# Patient Record
Sex: FEMALE | Race: WHITE | NOT HISPANIC OR LATINO | URBAN - METROPOLITAN AREA
[De-identification: names, ages, dates, MRNs, and addresses within clinical notes are randomized per-mention and may not be internally consistent; named-entity substitution may affect disease eponyms.]

---

## 2018-09-18 ENCOUNTER — OFFICE VISIT (OUTPATIENT)
Dept: URBAN - METROPOLITAN AREA CLINIC 29 | Facility: CLINIC | Age: 78
End: 2018-09-18
Payer: MEDICARE

## 2018-09-18 PROCEDURE — 99213 OFFICE O/P EST LOW 20 MIN: CPT | Performed by: OPHTHALMOLOGY

## 2018-09-18 ASSESSMENT — INTRAOCULAR PRESSURE
OD: 17
OS: 18

## 2018-09-18 NOTE — IMPRESSION/PLAN
Impression: Primary open-angle glaucoma, bilateral, severe stage. Code: I64.7466. ALT OS 2016 ALT OS 10/19/17 [address] OS 5/4/18 Plan: Discussed diagnosis, explained and understood by patient. Discussed IOP/ONH/Glaucoma management and risks. Continue Lumigan QHS OU and Timolol BID OU. Will continue to monitor.

## 2019-01-22 ENCOUNTER — OFFICE VISIT (OUTPATIENT)
Dept: URBAN - METROPOLITAN AREA CLINIC 29 | Facility: CLINIC | Age: 79
End: 2019-01-22
Payer: MEDICARE

## 2019-01-22 PROCEDURE — 92083 EXTENDED VISUAL FIELD XM: CPT | Performed by: OPHTHALMOLOGY

## 2019-01-22 PROCEDURE — 92133 CPTRZD OPH DX IMG PST SGM ON: CPT | Performed by: OPHTHALMOLOGY

## 2019-01-22 PROCEDURE — 99213 OFFICE O/P EST LOW 20 MIN: CPT | Performed by: OPHTHALMOLOGY

## 2019-01-22 RX ORDER — NETARSUDIL 0.2 MG/ML
0.02 % SOLUTION/ DROPS OPHTHALMIC; TOPICAL
Qty: 1 | Refills: 11 | Status: INACTIVE
Start: 2019-01-22 | End: 2019-05-03

## 2019-01-22 ASSESSMENT — INTRAOCULAR PRESSURE
OS: 19
OD: 19

## 2019-01-22 NOTE — IMPRESSION/PLAN
Impression: Primary open-angle glaucoma, bilateral, severe stage. Code: O36.6078. ALT OS 2016 ALT OS 10/19/17 [address] OS 5/4/18 IOP borderline Plan: Discussed diagnosis, explained and understood by patient. Discussed IOP/ONH/Glaucoma management and risks. OCT and Visual field ordered and reviewed today. Continue timolol bid ou and lumigan qhs ou. Start rhopressa qhs os, sample given. Discussed side effects of drops with pt. Will continue to monitor condition and symptoms.

## 2019-02-22 ENCOUNTER — OFFICE VISIT (OUTPATIENT)
Dept: URBAN - METROPOLITAN AREA CLINIC 29 | Facility: CLINIC | Age: 79
End: 2019-02-22
Payer: MEDICARE

## 2019-02-22 PROCEDURE — 99213 OFFICE O/P EST LOW 20 MIN: CPT | Performed by: OPHTHALMOLOGY

## 2019-02-22 RX ORDER — DUREZOL 0.5 MG/ML
0.05 % EMULSION OPHTHALMIC
Qty: 1 | Refills: 0 | Status: INACTIVE
Start: 2019-02-22 | End: 2019-04-15

## 2019-02-22 RX ORDER — BIMATOPROST 0.1 MG/ML
0.01 % SOLUTION/ DROPS OPHTHALMIC
Qty: 2.5 | Refills: 11 | Status: INACTIVE
Start: 2019-02-22 | End: 2019-12-02

## 2019-02-22 ASSESSMENT — INTRAOCULAR PRESSURE
OS: 16
OD: 18

## 2019-02-22 NOTE — IMPRESSION/PLAN
Impression: Primary open-angle glaucoma, bilateral, severe stage. Code: Q62.4503. ALT OS 2016 ---ALT OS 10/19/17 [facility]  micropulse OS 5/4/18-- irregular and fast heart beats - Plan: Discussed diagnosis, explained and understood by patient. Discussed IOP/ONH/Glaucoma management and risks. OCT and Visual field ordered and reviewed today. Continue timolol bid ou and lumigan qhs ou.  
patient schedule for cardiac implant march 6th (implantable loop recorder)
discontinue rhopressa OS. discussed Trabeculectomy OD vs 
repeat [facility] laser OS if unable to tolerate glaucoma meds or cost too high. Discussed RBA's and laser procedure. RL=2 Patient elects [facility] laser OS.  sample of durezol bid OS x 7 days after laser -

## 2019-04-23 ENCOUNTER — SURGERY (OUTPATIENT)
Dept: URBAN - METROPOLITAN AREA SURGERY 11 | Facility: SURGERY | Age: 79
End: 2019-04-23
Payer: MEDICARE

## 2019-04-23 PROCEDURE — 66710 CILIARY TRANSSLERAL THERAPY: CPT | Performed by: OPHTHALMOLOGY

## 2019-05-03 ENCOUNTER — POST-OPERATIVE VISIT (OUTPATIENT)
Dept: URBAN - METROPOLITAN AREA CLINIC 29 | Facility: CLINIC | Age: 79
End: 2019-05-03

## 2019-05-03 DIAGNOSIS — Z09 ENCNTR FOR F/U EXAM AFT TRTMT FOR COND OTH THAN MALIG NEOPLM: Primary | ICD-10-CM

## 2019-05-03 PROCEDURE — 99024 POSTOP FOLLOW-UP VISIT: CPT | Performed by: OPHTHALMOLOGY

## 2019-05-03 ASSESSMENT — INTRAOCULAR PRESSURE
OD: 19
OS: 18

## 2019-06-07 ENCOUNTER — POST-OPERATIVE VISIT (OUTPATIENT)
Dept: URBAN - METROPOLITAN AREA CLINIC 29 | Facility: CLINIC | Age: 79
End: 2019-06-07
Payer: MEDICARE

## 2019-06-07 PROCEDURE — 99024 POSTOP FOLLOW-UP VISIT: CPT | Performed by: OPHTHALMOLOGY

## 2019-06-07 RX ORDER — PREDNISOLONE ACETATE 10 MG/ML
1 % SUSPENSION/ DROPS OPHTHALMIC
Qty: 1 | Refills: 0 | Status: INACTIVE
Start: 2019-06-07 | End: 2019-06-07

## 2019-06-07 RX ORDER — DUREZOL 0.5 MG/ML
0.05 % EMULSION OPHTHALMIC
Qty: 1 | Refills: 1 | Status: INACTIVE
Start: 2019-06-07 | End: 2019-07-11

## 2019-06-07 RX ORDER — TIMOLOL MALEATE 5 MG/ML
0.5 % SOLUTION/ DROPS OPHTHALMIC
Qty: 5 | Refills: 5 | Status: INACTIVE
Start: 2019-06-07 | End: 2020-02-13

## 2019-06-07 ASSESSMENT — INTRAOCULAR PRESSURE
OD: 19
OS: 17

## 2019-07-03 ENCOUNTER — SURGERY (OUTPATIENT)
Dept: URBAN - METROPOLITAN AREA SURGERY 11 | Facility: SURGERY | Age: 79
End: 2019-07-03
Payer: MEDICARE

## 2019-07-03 PROCEDURE — 65855 TRABECULOPLASTY LASER SURG: CPT | Performed by: OPHTHALMOLOGY

## 2019-07-11 ENCOUNTER — POST-OPERATIVE VISIT (OUTPATIENT)
Dept: URBAN - METROPOLITAN AREA CLINIC 29 | Facility: CLINIC | Age: 79
End: 2019-07-11

## 2019-07-11 PROCEDURE — 99024 POSTOP FOLLOW-UP VISIT: CPT | Performed by: OPTOMETRIST

## 2019-07-11 ASSESSMENT — INTRAOCULAR PRESSURE
OS: 17
OD: 17

## 2019-08-09 ENCOUNTER — OFFICE VISIT (OUTPATIENT)
Dept: URBAN - METROPOLITAN AREA CLINIC 29 | Facility: CLINIC | Age: 79
End: 2019-08-09
Payer: MEDICARE

## 2019-08-09 PROCEDURE — 99213 OFFICE O/P EST LOW 20 MIN: CPT | Performed by: OPHTHALMOLOGY

## 2019-08-09 ASSESSMENT — INTRAOCULAR PRESSURE
OS: 19
OD: 18

## 2019-08-09 NOTE — IMPRESSION/PLAN
Impression: Primary open-angle glaucoma, bilateral, severe stage. Code: I70.8470. ALT OS 2016 ALT OS 10/19/17 [address] OS 5/4/18
irregular and fast heart beats Plan: Discussed diagnosis, explained and understood by patient. Discussed IOP/ONH/Glaucoma management and risks. Continue timolol bid ou and lumigan qhs ou. Will continue to monitor condition and symptoms.

## 2019-12-12 ENCOUNTER — OFFICE VISIT (OUTPATIENT)
Dept: URBAN - METROPOLITAN AREA CLINIC 23 | Facility: CLINIC | Age: 79
End: 2019-12-12
Payer: MEDICARE

## 2019-12-12 PROCEDURE — 99213 OFFICE O/P EST LOW 20 MIN: CPT | Performed by: OPHTHALMOLOGY

## 2019-12-12 PROCEDURE — 92083 EXTENDED VISUAL FIELD XM: CPT | Performed by: OPHTHALMOLOGY

## 2019-12-12 PROCEDURE — 92133 CPTRZD OPH DX IMG PST SGM ON: CPT | Performed by: OPHTHALMOLOGY

## 2019-12-12 ASSESSMENT — INTRAOCULAR PRESSURE
OS: 18
OD: 18

## 2019-12-12 NOTE — IMPRESSION/PLAN
Impression: Primary open-angle glaucoma, bilateral, severe stage. Code: H25.4284. ALT OS 2016 ALT OS 10/19/17 [address] OS 5/4/18
irregular and fast heart beats Plan: Discussed diagnosis, explained and understood by patient. Discussed IOP/ONH/Glaucoma management and risks. OCT and VF ordered and reviewed. Continue timolol bid ou and lumigan qhs ou. No Additional laser is recommended at this time. Will continue to monitor condition and symptoms.

## 2020-04-21 ENCOUNTER — OFFICE VISIT (OUTPATIENT)
Dept: URBAN - METROPOLITAN AREA CLINIC 23 | Facility: CLINIC | Age: 80
End: 2020-04-21
Payer: MEDICARE

## 2020-04-21 PROCEDURE — 99213 OFFICE O/P EST LOW 20 MIN: CPT | Performed by: OPHTHALMOLOGY

## 2020-04-21 ASSESSMENT — INTRAOCULAR PRESSURE
OS: 15
OD: 14

## 2020-04-21 NOTE — IMPRESSION/PLAN
Impression: Primary open-angle glaucoma, bilateral, severe stage. Code: O66.1631. ALT OS 2016 ALT OS 10/19/17 [address] OS 5/4/18
irregular and fast heart beats Plan: Discussed diagnosis, explained and understood by patient. Discussed IOP/ONH/Glaucoma management and risks. Continue timolol bid ou and lumigan qhs ou, as instructed, patient understand compliance with Drops. No Additional laser is recommended at this time. Will continue to monitor condition and symptoms.

## 2020-08-12 ENCOUNTER — OFFICE VISIT (OUTPATIENT)
Dept: URBAN - METROPOLITAN AREA CLINIC 23 | Facility: CLINIC | Age: 80
End: 2020-08-12
Payer: MEDICARE

## 2020-08-12 PROCEDURE — 92133 CPTRZD OPH DX IMG PST SGM ON: CPT | Performed by: OPHTHALMOLOGY

## 2020-08-12 PROCEDURE — 99214 OFFICE O/P EST MOD 30 MIN: CPT | Performed by: OPHTHALMOLOGY

## 2020-08-12 PROCEDURE — 92083 EXTENDED VISUAL FIELD XM: CPT | Performed by: OPHTHALMOLOGY

## 2020-08-12 ASSESSMENT — INTRAOCULAR PRESSURE
OD: 16
OS: 16

## 2020-08-12 NOTE — IMPRESSION/PLAN
Impression: Primary open-angle glaucoma, bilateral, severe stage. Code: K76.5512. ALT OS 2016 ALT OS 10/19/17 [address] OS 5/4/18
irregular and fast heart beats Plan: Discussed diagnosis, explained and understood by patient. Discussed IOP/ONH/Glaucoma management and risks. VF and OCT ordered and reviewed today shows no progression OU. Continue timolol bid ou and lumigan qhs ou. Will continue to monitor condition and symptoms.

## 2020-09-18 ENCOUNTER — OFFICE VISIT (OUTPATIENT)
Dept: URBAN - METROPOLITAN AREA CLINIC 23 | Facility: CLINIC | Age: 80
End: 2020-09-18
Payer: MEDICARE

## 2020-09-18 PROCEDURE — 92134 CPTRZ OPH DX IMG PST SGM RTA: CPT | Performed by: OPHTHALMOLOGY

## 2020-09-18 PROCEDURE — 92014 COMPRE OPH EXAM EST PT 1/>: CPT | Performed by: OPHTHALMOLOGY

## 2020-09-18 ASSESSMENT — INTRAOCULAR PRESSURE
OD: 17
OS: 17

## 2020-09-18 NOTE — IMPRESSION/PLAN
Impression: Puckering of macula, right eye: H35.371. Right. Condition: new prob, no addtl w/u needed. Hx of Repair of RD OS 03/2013 w/Dr Kessler Plan: Discussed diagnosis in detail with patient. Exam shows ERM confirmed by OCT and Optos OD. No treatment is required at this time. Will continue to observe condition and or symptoms. Will reassess condition in 6 mos, sooner if there is a change or decrease in vision. Continue care with Dr Bill Machado for the management of Glaucoma and use Glaucoma meds as recommended by him.

## 2021-01-07 ENCOUNTER — OFFICE VISIT (OUTPATIENT)
Dept: URBAN - METROPOLITAN AREA CLINIC 23 | Facility: CLINIC | Age: 81
End: 2021-01-07
Payer: MEDICARE

## 2021-01-07 PROCEDURE — 99213 OFFICE O/P EST LOW 20 MIN: CPT | Performed by: OPHTHALMOLOGY

## 2021-01-07 RX ORDER — DORZOLAMIDE HYDROCHLORIDE AND TIMOLOL MALEATE 20; 5 MG/ML; MG/ML
SOLUTION/ DROPS OPHTHALMIC
Qty: 5 | Refills: 11 | Status: INACTIVE
Start: 2021-01-07 | End: 2022-02-24

## 2021-01-07 ASSESSMENT — INTRAOCULAR PRESSURE
OS: 18
OD: 18

## 2021-01-07 NOTE — IMPRESSION/PLAN
Impression: Primary open-angle glaucoma, bilateral, severe stage. Code: N89.6201. ALT OS 2016 ALT OS 10/19/17 [facility] OS 5/4/18
irregular and fast heart beats Plan: Discussed diagnosis, explained and understood by patient. Discussed IOP/ONH/Glaucoma management and risks. D/C timolol bid ou. Continue lumigan qhs ou. Discussed adding drops vs laser to lower IOP. Patient elects drops. Start dorzolamide-timolol bid ou, sample given. Discussed side effects of drops. Will recheck IOP in 1 month.

## 2021-02-24 ENCOUNTER — OFFICE VISIT (OUTPATIENT)
Dept: URBAN - METROPOLITAN AREA CLINIC 23 | Facility: CLINIC | Age: 81
End: 2021-02-24
Payer: MEDICARE

## 2021-02-24 PROCEDURE — 99213 OFFICE O/P EST LOW 20 MIN: CPT | Performed by: OPHTHALMOLOGY

## 2021-02-24 ASSESSMENT — INTRAOCULAR PRESSURE
OD: 15
OS: 16

## 2021-02-24 NOTE — IMPRESSION/PLAN
Impression: Primary open-angle glaucoma, bilateral, severe stage. Code: O46.5144. ALT OS 2016 ALT OS 10/19/17 [facility] OS 5/4/18
irregular and fast heart beats Plan: Discussed diagnosis, explained and understood by patient. Discussed IOP/ONH/Glaucoma management and risks. Exam today shows stable IOP OU, additional medication was effective in lowering her IOP recommend to Continue dorzolamide-timolol bid ou, and Continue lumigan qhs ou, as instructed. Discussed side effects of drops, patient accepts and understands treatment plan.

## 2021-03-25 ENCOUNTER — OFFICE VISIT (OUTPATIENT)
Dept: URBAN - METROPOLITAN AREA CLINIC 23 | Facility: CLINIC | Age: 81
End: 2021-03-25
Payer: MEDICARE

## 2021-03-25 DIAGNOSIS — H35.371 PUCKERING OF MACULA, RIGHT EYE: Primary | ICD-10-CM

## 2021-03-25 PROCEDURE — 92134 CPTRZ OPH DX IMG PST SGM RTA: CPT | Performed by: OPHTHALMOLOGY

## 2021-03-25 PROCEDURE — 99213 OFFICE O/P EST LOW 20 MIN: CPT | Performed by: OPHTHALMOLOGY

## 2021-03-25 ASSESSMENT — INTRAOCULAR PRESSURE
OD: 15
OS: 15

## 2021-03-25 NOTE — IMPRESSION/PLAN
Impression: Puckering of macula, right eye: H35.371. Right. Condition: stable. Hx of Repair of RD OS 03/2013 w/Dr Kessler Plan: Due to Coronavirus COVID-19 pandemic and National Emergency, deferred Slit Lamp examination. Findings are based on OCT and Optos. OCT and Optos shows stable ERM OD. No treatment is require at this time. Recommend a retina follow - up in 6 mos. Patient can proceed with a refraction.

## 2021-05-10 ENCOUNTER — OFFICE VISIT (OUTPATIENT)
Dept: URBAN - METROPOLITAN AREA CLINIC 23 | Facility: CLINIC | Age: 81
End: 2021-05-10

## 2021-05-10 DIAGNOSIS — H52.223 REGULAR ASTIGMATISM, BILATERAL: Primary | ICD-10-CM

## 2021-05-10 ASSESSMENT — INTRAOCULAR PRESSURE
OD: 15
OS: 15

## 2021-07-14 ENCOUNTER — OFFICE VISIT (OUTPATIENT)
Dept: URBAN - METROPOLITAN AREA CLINIC 23 | Facility: CLINIC | Age: 81
End: 2021-07-14
Payer: MEDICARE

## 2021-07-14 DIAGNOSIS — H40.1133 PRIMARY OPEN-ANGLE GLAUCOMA, BILATERAL, SEVERE STAGE: Primary | ICD-10-CM

## 2021-07-14 PROCEDURE — 92083 EXTENDED VISUAL FIELD XM: CPT | Performed by: OPHTHALMOLOGY

## 2021-07-14 PROCEDURE — 99214 OFFICE O/P EST MOD 30 MIN: CPT | Performed by: OPHTHALMOLOGY

## 2021-07-14 PROCEDURE — 92133 CPTRZD OPH DX IMG PST SGM ON: CPT | Performed by: OPHTHALMOLOGY

## 2021-07-14 PROCEDURE — 92020 GONIOSCOPY: CPT | Performed by: OPHTHALMOLOGY

## 2021-07-14 ASSESSMENT — INTRAOCULAR PRESSURE
OD: 18
OS: 19

## 2021-07-14 NOTE — IMPRESSION/PLAN
Impression: Primary open-angle glaucoma, bilateral, severe stage. Code: O58.7221. ALT OS 2016 ALT OS 10/19/17 [address] OS 5/4/18
irregular and fast heart beats Plan: Discussed diagnosis, explained and understood by patient. Discussed IOP/ONH/Glaucoma management and risks. Exam and IOP today shows increased IOP, Continue with Dorzolamide-timolol bid ou, and Continue lumigan qhs ou, as instructed. Discussed adding drops vs laser. Patient elects SLT. Recommend Trabeculoplasty (SLT) OS to possibly lower IOP. Discussed RBA's of laser trabeculoplasty .  RL=2

## 2021-09-10 ENCOUNTER — SURGERY (OUTPATIENT)
Dept: URBAN - METROPOLITAN AREA SURGERY 11 | Facility: SURGERY | Age: 81
End: 2021-09-10
Payer: MEDICARE

## 2021-09-10 PROCEDURE — 65855 TRABECULOPLASTY LASER SURG: CPT | Performed by: OPHTHALMOLOGY

## 2021-10-01 ENCOUNTER — OFFICE VISIT (OUTPATIENT)
Dept: URBAN - METROPOLITAN AREA CLINIC 23 | Facility: CLINIC | Age: 81
End: 2021-10-01
Payer: MEDICARE

## 2021-10-01 PROCEDURE — 99213 OFFICE O/P EST LOW 20 MIN: CPT | Performed by: OPHTHALMOLOGY

## 2021-10-01 PROCEDURE — 92134 CPTRZ OPH DX IMG PST SGM RTA: CPT | Performed by: OPHTHALMOLOGY

## 2021-10-01 ASSESSMENT — INTRAOCULAR PRESSURE
OS: 19
OD: 18

## 2021-10-01 NOTE — IMPRESSION/PLAN
Impression: Puckering of macula, right eye: H35.371. Right. Condition: stable. Hx of Repair of RD OS 03/2013 w/Dr Kessler Plan: Due to Coronavirus COVID-19 pandemic and National Emergency, deferred Slit Lamp examination. Findings are based on OCT and Optos. OCT and Optos shows stable ERM, no significant change from 3/25/21 OD. No treatment is required at this time. Recommend a retina follow - up in 9 months, sooner if there is a sudden change in vision. Advised patient that at this time the pucker does not seem to be affecting the central vision therefore surgical treatment is not needed. Patient has questions about IOP-- defer patient to Dr Pretty Whitlock to discuss why IOP has not decreased since her SLT procedure.

## 2021-10-11 ENCOUNTER — OFFICE VISIT (OUTPATIENT)
Dept: URBAN - METROPOLITAN AREA CLINIC 23 | Facility: CLINIC | Age: 81
End: 2021-10-11
Payer: MEDICARE

## 2021-10-11 PROCEDURE — 99213 OFFICE O/P EST LOW 20 MIN: CPT | Performed by: OPHTHALMOLOGY

## 2021-10-11 RX ORDER — BIMATOPROST 0.1 MG/ML
0.01 % SOLUTION/ DROPS OPHTHALMIC
Qty: 7.5 | Refills: 4 | Status: ACTIVE
Start: 2021-10-11

## 2021-10-11 ASSESSMENT — INTRAOCULAR PRESSURE
OD: 18
OS: 16

## 2021-10-11 NOTE — IMPRESSION/PLAN
Impression: Primary open-angle glaucoma, bilateral, severe stage. Code: U49.6875. Hx of irregular and fast heart beats ALT OS 2016 ALT OS 10/19/17 [address] OS 5/4/18 SLT OS 9/10/21 Plan: Discussed diagnosis, explained and understood by patient. Discussed IOP/ONH/Glaucoma management and risks. Continue with Dorzolamide-timolol bid ou, and lumigan qhs ou. Will continue to monitor pressures.

## 2022-02-23 ENCOUNTER — OFFICE VISIT (OUTPATIENT)
Dept: URBAN - METROPOLITAN AREA CLINIC 23 | Facility: CLINIC | Age: 82
End: 2022-02-23
Payer: MEDICARE

## 2022-02-23 PROCEDURE — 99213 OFFICE O/P EST LOW 20 MIN: CPT | Performed by: OPHTHALMOLOGY

## 2022-02-23 ASSESSMENT — INTRAOCULAR PRESSURE
OS: 16
OD: 16

## 2022-02-23 NOTE — IMPRESSION/PLAN
Impression: Primary open-angle glaucoma, bilateral, severe stage. Code: U34.0908. Hx of irregular and fast heart beats ALT OS 2016 ALT OS 10/19/17 [address] OS 5/4/18 SLT OS 9/10/21 Plan: Discussed diagnosis, explained and understood by patient. Discussed IOP/ONH/Glaucoma management and risks. Continue with Dorzolamide-timolol bid ou, and lumigan qhs ou. Will continue to monitor pressures.

## 2022-07-08 ENCOUNTER — OFFICE VISIT (OUTPATIENT)
Dept: URBAN - METROPOLITAN AREA CLINIC 23 | Facility: CLINIC | Age: 82
End: 2022-07-08
Payer: MEDICARE

## 2022-07-08 DIAGNOSIS — H35.371 PUCKERING OF MACULA, RIGHT EYE: Primary | ICD-10-CM

## 2022-07-08 PROCEDURE — 92134 CPTRZ OPH DX IMG PST SGM RTA: CPT | Performed by: OPHTHALMOLOGY

## 2022-07-08 PROCEDURE — 99213 OFFICE O/P EST LOW 20 MIN: CPT | Performed by: OPHTHALMOLOGY

## 2022-07-08 ASSESSMENT — INTRAOCULAR PRESSURE
OD: 11
OS: 10

## 2022-07-08 NOTE — IMPRESSION/PLAN
Impression: Puckering of macula, right eye: H35.371. Right. Condition: stable. Vision: vision not affected. Hx of Repair of RD OS 03/2013 w/Dr Kessler Plan: Discussed diagnosis in detail with patient. Discussed treatment options with patient. No treatment needed at this time based on exam and diagnostic tests. Discussed surgery can be done to remove scar tissue. Patient defers surgery at this time. OCT and Optos shows stable ERM. Recommend a retina follow - up in 6 months.

## 2022-08-18 ENCOUNTER — OFFICE VISIT (OUTPATIENT)
Dept: URBAN - METROPOLITAN AREA CLINIC 23 | Facility: CLINIC | Age: 82
End: 2022-08-18
Payer: MEDICARE

## 2022-08-18 DIAGNOSIS — H40.1133 PRIMARY OPEN-ANGLE GLAUCOMA, BILATERAL, SEVERE STAGE: Primary | ICD-10-CM

## 2022-08-18 PROCEDURE — 99214 OFFICE O/P EST MOD 30 MIN: CPT | Performed by: OPHTHALMOLOGY

## 2022-08-18 PROCEDURE — 92133 CPTRZD OPH DX IMG PST SGM ON: CPT | Performed by: OPHTHALMOLOGY

## 2022-08-18 PROCEDURE — 92083 EXTENDED VISUAL FIELD XM: CPT | Performed by: OPHTHALMOLOGY

## 2022-08-18 RX ORDER — LOSARTAN POTASSIUM 50 MG/1
50 MG TABLET, FILM COATED ORAL
Qty: 0 | Refills: 0 | Status: ACTIVE
Start: 2022-08-18

## 2022-08-18 ASSESSMENT — INTRAOCULAR PRESSURE
OD: 18
OS: 16

## 2022-11-10 ENCOUNTER — OFFICE VISIT (OUTPATIENT)
Dept: URBAN - METROPOLITAN AREA CLINIC 23 | Facility: CLINIC | Age: 82
End: 2022-11-10
Payer: MEDICARE

## 2022-11-10 DIAGNOSIS — H40.1133 PRIMARY OPEN-ANGLE GLAUCOMA, BILATERAL, SEVERE STAGE: Primary | ICD-10-CM

## 2022-11-10 PROCEDURE — 99213 OFFICE O/P EST LOW 20 MIN: CPT | Performed by: OPHTHALMOLOGY

## 2022-11-10 ASSESSMENT — INTRAOCULAR PRESSURE
OD: 15
OS: 16

## 2022-11-10 NOTE — IMPRESSION/PLAN
Impression: Primary open-angle glaucoma, bilateral, severe stage. Code: S26.9555. Hx of irregular and fast heart beats ALT OS 2016 ALT OS 10/19/17 [address] OS 5/4/18 SLT OS 9/10/21 SLT OD 10/7/22 Plan: Discussed diagnosis, explained and understood by patient. Discussed IOP/ONH/Glaucoma management and risks. SLT OD effectively lowered IOP. Continue Dorzolamide-timolol bid ou, and lumigan qhs ou.

## 2023-01-10 ENCOUNTER — OFFICE VISIT (OUTPATIENT)
Dept: URBAN - METROPOLITAN AREA CLINIC 23 | Facility: CLINIC | Age: 83
End: 2023-01-10
Payer: MEDICARE

## 2023-01-10 DIAGNOSIS — H35.371 PUCKERING OF MACULA, RIGHT EYE: Primary | ICD-10-CM

## 2023-01-10 PROCEDURE — 99214 OFFICE O/P EST MOD 30 MIN: CPT | Performed by: OPHTHALMOLOGY

## 2023-01-10 PROCEDURE — 92134 CPTRZ OPH DX IMG PST SGM RTA: CPT | Performed by: OPHTHALMOLOGY

## 2023-01-10 RX ORDER — LOTEPREDNOL ETABONATE 5 MG/G
0.5 % GEL OPHTHALMIC
Qty: 5 | Refills: 3 | Status: ACTIVE
Start: 2023-01-10

## 2023-01-10 RX ORDER — OFLOXACIN 3 MG/ML
0.3 % SOLUTION/ DROPS OPHTHALMIC
Qty: 5 | Refills: 1 | Status: ACTIVE
Start: 2023-01-10

## 2023-01-10 RX ORDER — DUREZOL 0.5 MG/ML
0.05 % EMULSION OPHTHALMIC
Qty: 5 | Refills: 5 | Status: INACTIVE
Start: 2023-01-10 | End: 2023-01-10

## 2023-01-10 ASSESSMENT — INTRAOCULAR PRESSURE
OS: 16
OD: 16

## 2023-01-10 NOTE — IMPRESSION/PLAN
Impression: Puckering of macula, right eye: H35.371. Right. Condition: stable. Vision: vision affected. Hx of Repair of RD OS 03/2013 w/Dr Brenna Villalobos Hx of Glaucoma - severe stage OU. Plan: Discussed diagnosis in detail with patient. Discussed risks of progression. Surgical treatment is recommended to remove scar tissue for possible vision improvement PPVx 25 ga, ERMx, Brilliant Blue RIGHT EYE. Surgical risks and benefits were discussed, explained and understood by patient. All questions answered. RL1. Patient elects to proceed with recommendation. OCT and Optos OD shows ERM. Educational material provided to patient. Erx Loteprednol (no PF due to hx of Glaucoma) and Ofloxacin to patient's pharmacy.

## 2023-02-14 ENCOUNTER — SURGERY (OUTPATIENT)
Dept: URBAN - METROPOLITAN AREA SURGERY 11 | Facility: SURGERY | Age: 83
End: 2023-02-14
Payer: MEDICARE

## 2023-02-14 PROCEDURE — 67042 VIT FOR MACULAR HOLE: CPT | Performed by: OPHTHALMOLOGY

## 2023-02-14 RX ORDER — LOTEPREDNOL ETABONATE 3.8 MG/G
0.38 % GEL OPHTHALMIC
Qty: 5 | Refills: 5 | Status: INACTIVE
Start: 2023-02-14 | End: 2023-02-14

## 2023-02-14 RX ORDER — LOTEPREDNOL ETABONATE 3.8 MG/G
0.38 % GEL OPHTHALMIC
Qty: 5 | Refills: 5 | Status: ACTIVE
Start: 2023-02-14

## 2023-02-15 ENCOUNTER — POST-OPERATIVE VISIT (OUTPATIENT)
Dept: URBAN - METROPOLITAN AREA CLINIC 23 | Facility: CLINIC | Age: 83
End: 2023-02-15
Payer: MEDICARE

## 2023-02-15 DIAGNOSIS — Z48.810 ENCOUNTER FOR SURGICAL AFTERCARE FOLLOWING SURGERY ON A SENSE ORGAN: Primary | ICD-10-CM

## 2023-02-15 PROCEDURE — 99024 POSTOP FOLLOW-UP VISIT: CPT | Performed by: OPTOMETRIST

## 2023-02-15 ASSESSMENT — INTRAOCULAR PRESSURE
OD: 12
OS: 10

## 2023-02-15 NOTE — IMPRESSION/PLAN
Impression: S/P [place] Vitrectomy 25ga - [id number]; Epiretinal Membranectomy; AFX (Air Fluid Gas Exchange); [id number] and [id number] OD - 1 Day. Encounter for surgical aftercare following surgery on a sense organ  Z48.810. Plan: Pt edu. Appropriate appearance and IOP. Use AT's PRN OU for comfort. Continue Ofloxacin QID OD, Flarex QID OD, Dorzolamide-Timolol BID OU and Lumigan QHS OU. Advised patient to call with any issues. RTC 1 week PO.

## 2023-02-23 ENCOUNTER — POST-OPERATIVE VISIT (OUTPATIENT)
Dept: URBAN - METROPOLITAN AREA CLINIC 23 | Facility: CLINIC | Age: 83
End: 2023-02-23
Payer: MEDICARE

## 2023-02-23 DIAGNOSIS — Z48.810 ENCOUNTER FOR SURGICAL AFTERCARE FOLLOWING SURGERY ON A SENSE ORGAN: Primary | ICD-10-CM

## 2023-02-23 PROCEDURE — 99024 POSTOP FOLLOW-UP VISIT: CPT | Performed by: OPHTHALMOLOGY

## 2023-02-23 ASSESSMENT — INTRAOCULAR PRESSURE
OD: 25
OS: 17

## 2023-02-23 NOTE — IMPRESSION/PLAN
Impression: S/P [place] Vitrectomy 25ga - [id number]; Epiretinal Membranectomy; AFX (Air Fluid Gas Exchange); [id number] and [id number] OD - 9 Days. Encounter for surgical aftercare following surgery on a sense organ  Z48.810. Excellent post op course   Post operative instructions reviewed - Plan: Exam and OCT OD shows no ERM, marked decrease in edema. Pt may d/c Ofloxacin, and continue Flarex QID OD. Pt to continue glaucoma drops (Dorzolamide BID OU and Lumigan QHS OU) as directed by Dr Leonie Jackson. Vision should continue to improve as time goes by. Will reassess the retina in 8 weeks. Flarex QID OD Lumigan QHS OU Dorzolamide/ Timolol BID OU

## 2023-03-22 ENCOUNTER — OFFICE VISIT (OUTPATIENT)
Dept: URBAN - METROPOLITAN AREA CLINIC 23 | Facility: CLINIC | Age: 83
End: 2023-03-22
Payer: MEDICARE

## 2023-03-22 DIAGNOSIS — H40.1133 PRIMARY OPEN-ANGLE GLAUCOMA, BILATERAL, SEVERE STAGE: Primary | ICD-10-CM

## 2023-03-22 PROCEDURE — 99213 OFFICE O/P EST LOW 20 MIN: CPT | Performed by: OPHTHALMOLOGY

## 2023-03-22 ASSESSMENT — INTRAOCULAR PRESSURE
OS: 17
OD: 17

## 2023-03-22 NOTE — IMPRESSION/PLAN
Impression: Primary open-angle glaucoma, bilateral, severe stage. Code: G63.2858. Hx of irregular and fast heart beats ALT OS 2016 ALT OS 10/19/17 [address] OS 5/4/18 SLT OS 9/10/21 SLT OD 10/7/22 Plan: Discussed diagnosis, explained and understood by patient. Discussed IOP/ONH/Glaucoma management and risks. Continue Dorzolamide-timolol bid ou, and lumigan qhs ou. Will continue to monitor pressures.

## 2023-04-14 ENCOUNTER — POST-OPERATIVE VISIT (OUTPATIENT)
Dept: URBAN - METROPOLITAN AREA CLINIC 23 | Facility: CLINIC | Age: 83
End: 2023-04-14
Payer: MEDICARE

## 2023-04-14 DIAGNOSIS — Z48.810 ENCOUNTER FOR SURGICAL AFTERCARE FOLLOWING SURGERY ON A SENSE ORGAN: Primary | ICD-10-CM

## 2023-04-14 PROCEDURE — 99024 POSTOP FOLLOW-UP VISIT: CPT | Performed by: OPHTHALMOLOGY

## 2023-04-14 ASSESSMENT — INTRAOCULAR PRESSURE
OD: 13
OS: 14

## 2023-04-14 NOTE — IMPRESSION/PLAN
Impression: S/P [place] Pl[place] Vitrectomy 25ga - [id number]; Epiretinal Membranectomy; AFX (Air Fluid Gas Exchange); [id number] and [id number] OD - 59 Days. Encounter for surgical aftercare following surgery on a sense organ  Z48.810. Plan: Slit lamp exam deferred, findings based on diagnostic testing. OCT OD shows no significant swelling and Optos OD shows stable w/ no scar tissue or swelling. No further treatment is required at this time. Recommend observation. Will reassess the retina in 6 months - sooner if any changes. Continue Glaucoma Care w/ Dr. Marta Atwood.

## 2023-07-12 ENCOUNTER — OFFICE VISIT (OUTPATIENT)
Dept: URBAN - METROPOLITAN AREA CLINIC 23 | Facility: CLINIC | Age: 83
End: 2023-07-12
Payer: MEDICARE

## 2023-07-12 DIAGNOSIS — H40.1133 PRIMARY OPEN-ANGLE GLAUCOMA, BILATERAL, SEVERE STAGE: Primary | ICD-10-CM

## 2023-07-12 PROCEDURE — 92083 EXTENDED VISUAL FIELD XM: CPT | Performed by: OPHTHALMOLOGY

## 2023-07-12 PROCEDURE — 92133 CPTRZD OPH DX IMG PST SGM ON: CPT | Performed by: OPHTHALMOLOGY

## 2023-07-12 PROCEDURE — 99214 OFFICE O/P EST MOD 30 MIN: CPT | Performed by: OPHTHALMOLOGY

## 2023-07-12 ASSESSMENT — INTRAOCULAR PRESSURE
OS: 22
OD: 18

## 2023-07-12 NOTE — IMPRESSION/PLAN
Impression: Primary open-angle glaucoma, bilateral, severe stage. Code: C58.8080. Hx of irregular and fast heart beats ALT OS 2016 ALT OS 10/19/17 [address] OS 5/4/18 SLT OS 9/10/21 SLT OD 10/7/22 Plan: Discussed diagnosis, explained and understood by patient. Discussed IOP/ONH/Glaucoma management and risks. Continue Dorzolamide-timolol bid ou, and lumigan qhs ou. Discussed adding drops vs laser. Patient elects SLT. Recommend Trabeculoplasty (SLT) OS to possibly lower IOP. Discussed RBA's of laser trabeculoplasty .  RL=2

## 2023-10-27 ENCOUNTER — OFFICE VISIT (OUTPATIENT)
Dept: URBAN - METROPOLITAN AREA CLINIC 23 | Facility: CLINIC | Age: 83
End: 2023-10-27
Payer: MEDICARE

## 2023-10-27 DIAGNOSIS — H35.341 MACULAR CYST, HOLE, OR PSEUDOHOLE, RIGHT EYE: Primary | ICD-10-CM

## 2023-10-27 PROCEDURE — 99214 OFFICE O/P EST MOD 30 MIN: CPT | Performed by: OPHTHALMOLOGY

## 2023-10-27 PROCEDURE — 92134 CPTRZ OPH DX IMG PST SGM RTA: CPT | Performed by: OPHTHALMOLOGY

## 2023-10-27 ASSESSMENT — INTRAOCULAR PRESSURE
OD: 16
OS: 16

## 2023-12-19 ENCOUNTER — SURGERY (OUTPATIENT)
Dept: URBAN - METROPOLITAN AREA SURGERY 11 | Facility: SURGERY | Age: 83
End: 2023-12-19
Payer: MEDICARE

## 2023-12-19 PROCEDURE — 67042 VIT FOR MACULAR HOLE: CPT | Performed by: OPHTHALMOLOGY

## 2023-12-20 ENCOUNTER — POST-OPERATIVE VISIT (OUTPATIENT)
Dept: URBAN - METROPOLITAN AREA CLINIC 23 | Facility: CLINIC | Age: 83
End: 2023-12-20
Payer: MEDICARE

## 2023-12-20 PROCEDURE — 99024 POSTOP FOLLOW-UP VISIT: CPT | Performed by: OPTOMETRIST

## 2023-12-20 RX ORDER — DIFLUPREDNATE 0.5 MG/ML
0.05 % EMULSION OPHTHALMIC
Qty: 5 | Refills: 2 | Status: INACTIVE
Start: 2023-12-20 | End: 2023-12-22

## 2023-12-20 RX ORDER — KETOROLAC TROMETHAMINE 5 MG/ML
0.5 % SOLUTION OPHTHALMIC
Qty: 10 | Refills: 2 | Status: ACTIVE
Start: 2023-12-20

## 2023-12-20 ASSESSMENT — INTRAOCULAR PRESSURE
OS: 20
OD: 20

## 2023-12-28 ENCOUNTER — POST-OPERATIVE VISIT (OUTPATIENT)
Dept: URBAN - METROPOLITAN AREA CLINIC 23 | Facility: CLINIC | Age: 83
End: 2023-12-28
Payer: MEDICARE

## 2023-12-28 PROCEDURE — 99024 POSTOP FOLLOW-UP VISIT: CPT | Performed by: OPHTHALMOLOGY

## 2023-12-28 ASSESSMENT — INTRAOCULAR PRESSURE
OD: 18
OS: 16

## 2024-01-25 ENCOUNTER — POST-OPERATIVE VISIT (OUTPATIENT)
Dept: URBAN - METROPOLITAN AREA CLINIC 23 | Facility: CLINIC | Age: 84
End: 2024-01-25
Payer: COMMERCIAL

## 2024-01-25 DIAGNOSIS — Z48.810 ENCOUNTER FOR SURGICAL AFTERCARE FOLLOWING SURGERY ON A SENSE ORGAN: Primary | ICD-10-CM

## 2024-01-25 PROCEDURE — 99024 POSTOP FOLLOW-UP VISIT: CPT | Performed by: OPHTHALMOLOGY

## 2024-01-25 ASSESSMENT — INTRAOCULAR PRESSURE
OD: 18
OS: 16

## 2024-02-26 ENCOUNTER — OFFICE VISIT (OUTPATIENT)
Dept: URBAN - METROPOLITAN AREA CLINIC 23 | Facility: CLINIC | Age: 84
End: 2024-02-26
Payer: COMMERCIAL

## 2024-02-26 DIAGNOSIS — H40.1133 PRIMARY OPEN-ANGLE GLAUCOMA, BILATERAL, SEVERE STAGE: Primary | ICD-10-CM

## 2024-02-26 PROCEDURE — 99213 OFFICE O/P EST LOW 20 MIN: CPT | Performed by: OPHTHALMOLOGY

## 2024-02-26 ASSESSMENT — INTRAOCULAR PRESSURE
OS: 17
OD: 17

## 2024-03-15 ENCOUNTER — POST-OPERATIVE VISIT (OUTPATIENT)
Dept: URBAN - METROPOLITAN AREA CLINIC 23 | Facility: CLINIC | Age: 84
End: 2024-03-15
Payer: COMMERCIAL

## 2024-03-15 DIAGNOSIS — Z48.810 ENCOUNTER FOR SURGICAL AFTERCARE FOLLOWING SURGERY ON A SENSE ORGAN: Primary | ICD-10-CM

## 2024-03-15 PROCEDURE — 99024 POSTOP FOLLOW-UP VISIT: CPT | Performed by: OPHTHALMOLOGY

## 2024-03-15 ASSESSMENT — INTRAOCULAR PRESSURE
OS: 16
OD: 17

## 2024-06-17 ENCOUNTER — OFFICE VISIT (OUTPATIENT)
Dept: URBAN - METROPOLITAN AREA CLINIC 23 | Facility: CLINIC | Age: 84
End: 2024-06-17
Payer: COMMERCIAL

## 2024-06-17 PROCEDURE — 92133 CPTRZD OPH DX IMG PST SGM ON: CPT | Performed by: OPHTHALMOLOGY

## 2024-06-17 PROCEDURE — 92083 EXTENDED VISUAL FIELD XM: CPT | Performed by: OPHTHALMOLOGY

## 2024-06-17 PROCEDURE — 99214 OFFICE O/P EST MOD 30 MIN: CPT | Performed by: OPHTHALMOLOGY

## 2024-06-17 ASSESSMENT — INTRAOCULAR PRESSURE
OD: 17
OS: 17

## 2024-06-21 ENCOUNTER — OFFICE VISIT (OUTPATIENT)
Dept: URBAN - METROPOLITAN AREA CLINIC 23 | Facility: CLINIC | Age: 84
End: 2024-06-21
Payer: COMMERCIAL

## 2024-06-21 DIAGNOSIS — H35.341 MACULAR CYST, HOLE, OR PSEUDOHOLE, RIGHT EYE: ICD-10-CM

## 2024-06-21 DIAGNOSIS — H40.1133 PRIMARY OPEN-ANGLE GLAUCOMA, BILATERAL, SEVERE STAGE: Primary | ICD-10-CM

## 2024-06-21 PROCEDURE — 99213 OFFICE O/P EST LOW 20 MIN: CPT | Performed by: OPTOMETRIST

## 2024-06-21 ASSESSMENT — INTRAOCULAR PRESSURE
OD: 17
OS: 17

## 2024-06-21 ASSESSMENT — KERATOMETRY
OS: 43.88
OD: 43.75

## 2024-09-09 ENCOUNTER — POST-OPERATIVE VISIT (OUTPATIENT)
Dept: URBAN - METROPOLITAN AREA CLINIC 23 | Facility: CLINIC | Age: 84
End: 2024-09-09
Payer: COMMERCIAL

## 2024-09-09 DIAGNOSIS — Z48.810 ENCOUNTER FOR SURGICAL AFTERCARE FOLLOWING SURGERY ON A SENSE ORGAN: Primary | ICD-10-CM

## 2024-09-09 PROCEDURE — 99024 POSTOP FOLLOW-UP VISIT: CPT | Performed by: OPHTHALMOLOGY

## 2024-09-09 ASSESSMENT — INTRAOCULAR PRESSURE
OD: 16
OS: 17

## 2024-10-07 ENCOUNTER — OFFICE VISIT (OUTPATIENT)
Dept: URBAN - METROPOLITAN AREA CLINIC 23 | Facility: CLINIC | Age: 84
End: 2024-10-07
Payer: COMMERCIAL

## 2024-10-07 DIAGNOSIS — H40.1133 PRIMARY OPEN-ANGLE GLAUCOMA, BILATERAL, SEVERE STAGE: Primary | ICD-10-CM

## 2024-10-07 PROCEDURE — 99213 OFFICE O/P EST LOW 20 MIN: CPT | Performed by: OPHTHALMOLOGY

## 2024-10-07 ASSESSMENT — INTRAOCULAR PRESSURE
OS: 13
OD: 14

## 2024-12-30 ENCOUNTER — OFFICE VISIT (OUTPATIENT)
Dept: URBAN - METROPOLITAN AREA CLINIC 23 | Facility: CLINIC | Age: 84
End: 2024-12-30
Payer: COMMERCIAL

## 2024-12-30 DIAGNOSIS — H35.341 MACULAR CYST, HOLE, OR PSEUDOHOLE, RIGHT EYE: Primary | ICD-10-CM

## 2024-12-30 DIAGNOSIS — H40.1133 PRIMARY OPEN-ANGLE GLAUCOMA, BILATERAL, SEVERE STAGE: ICD-10-CM

## 2024-12-30 PROCEDURE — 92134 CPTRZ OPH DX IMG PST SGM RTA: CPT | Performed by: OPTOMETRIST

## 2024-12-30 PROCEDURE — 99213 OFFICE O/P EST LOW 20 MIN: CPT | Performed by: OPTOMETRIST

## 2024-12-30 RX ORDER — BIMATOPROST 0.1 MG/ML
0.01 % SOLUTION/ DROPS OPHTHALMIC
Qty: 7.5 | Refills: 4 | Status: ACTIVE
Start: 2024-12-30

## 2024-12-30 RX ORDER — DORZOLAMIDE HYDROCHLORIDE AND TIMOLOL MALEATE 20; 5 MG/ML; MG/ML
SOLUTION/ DROPS OPHTHALMIC
Qty: 30 | Refills: 3 | Status: ACTIVE
Start: 2024-12-30

## 2024-12-30 ASSESSMENT — KERATOMETRY
OS: 43.63
OD: 43.25

## 2024-12-30 ASSESSMENT — INTRAOCULAR PRESSURE
OD: 16
OS: 17

## 2025-06-19 ENCOUNTER — OFFICE VISIT (OUTPATIENT)
Dept: URBAN - METROPOLITAN AREA CLINIC 23 | Facility: CLINIC | Age: 85
End: 2025-06-19
Payer: COMMERCIAL

## 2025-06-19 DIAGNOSIS — H35.341 MACULAR CYST, HOLE, OR PSEUDOHOLE, RIGHT EYE: ICD-10-CM

## 2025-06-19 DIAGNOSIS — H40.1133 PRIMARY OPEN-ANGLE GLAUCOMA, BILATERAL, SEVERE STAGE: Primary | ICD-10-CM

## 2025-06-19 PROCEDURE — 92133 CPTRZD OPH DX IMG PST SGM ON: CPT | Performed by: OPTOMETRIST

## 2025-06-19 PROCEDURE — 92134 CPTRZ OPH DX IMG PST SGM RTA: CPT | Performed by: OPTOMETRIST

## 2025-06-19 PROCEDURE — 99213 OFFICE O/P EST LOW 20 MIN: CPT | Performed by: OPTOMETRIST

## 2025-06-19 ASSESSMENT — KERATOMETRY
OD: 43.50
OS: 43.75

## 2025-06-19 ASSESSMENT — INTRAOCULAR PRESSURE
OD: 17
OS: 16